# Patient Record
Sex: MALE | Race: WHITE
[De-identification: names, ages, dates, MRNs, and addresses within clinical notes are randomized per-mention and may not be internally consistent; named-entity substitution may affect disease eponyms.]

---

## 2018-04-18 ENCOUNTER — HOSPITAL ENCOUNTER (OUTPATIENT)
Dept: HOSPITAL 92 - SDC | Age: 58
Setting detail: OBSERVATION
LOS: 1 days | Discharge: HOME | End: 2018-04-19
Attending: NEUROLOGICAL SURGERY | Admitting: NEUROLOGICAL SURGERY
Payer: MEDICARE

## 2018-04-18 VITALS — BODY MASS INDEX: 21.9 KG/M2

## 2018-04-18 DIAGNOSIS — Z21: ICD-10-CM

## 2018-04-18 DIAGNOSIS — Z98.890: ICD-10-CM

## 2018-04-18 DIAGNOSIS — Z79.83: ICD-10-CM

## 2018-04-18 DIAGNOSIS — M47.12: Primary | ICD-10-CM

## 2018-04-18 DIAGNOSIS — Z79.82: ICD-10-CM

## 2018-04-18 DIAGNOSIS — Z79.52: ICD-10-CM

## 2018-04-18 DIAGNOSIS — M25.78: ICD-10-CM

## 2018-04-18 DIAGNOSIS — Z79.4: ICD-10-CM

## 2018-04-18 DIAGNOSIS — Z90.49: ICD-10-CM

## 2018-04-18 DIAGNOSIS — Z79.2: ICD-10-CM

## 2018-04-18 DIAGNOSIS — T86.12: ICD-10-CM

## 2018-04-18 DIAGNOSIS — Z79.899: ICD-10-CM

## 2018-04-18 LAB
ANION GAP SERPL CALC-SCNC: 11 MMOL/L (ref 10–20)
BASOPHILS # BLD AUTO: 0 THOU/UL (ref 0–0.2)
BASOPHILS NFR BLD AUTO: 0.2 % (ref 0–1)
BUN SERPL-MCNC: 37 MG/DL (ref 8.4–25.7)
CALCIUM SERPL-MCNC: 10.6 MG/DL (ref 7.8–10.44)
CHLORIDE SERPL-SCNC: 111 MMOL/L (ref 98–107)
CO2 SERPL-SCNC: 19 MMOL/L (ref 22–29)
CREAT CL PREDICTED SERPL C-G-VRATE: 86 ML/MIN (ref 70–130)
EOSINOPHIL # BLD AUTO: 0.1 THOU/UL (ref 0–0.7)
EOSINOPHIL NFR BLD AUTO: 1.9 % (ref 0–10)
GLUCOSE SERPL-MCNC: 171 MG/DL (ref 70–105)
HGB BLD-MCNC: 12.6 G/DL (ref 14–18)
LYMPHOCYTES # BLD: 1.6 THOU/UL (ref 1.2–3.4)
LYMPHOCYTES NFR BLD AUTO: 37.1 % (ref 21–51)
MCH RBC QN AUTO: 36.2 PG (ref 27–31)
MCV RBC AUTO: 102 FL (ref 80–94)
MONOCYTES # BLD AUTO: 0.6 THOU/UL (ref 0.11–0.59)
MONOCYTES NFR BLD AUTO: 14.4 % (ref 0–10)
NEUTROPHILS # BLD AUTO: 2 THOU/UL (ref 1.4–6.5)
NEUTROPHILS NFR BLD AUTO: 46.4 % (ref 42–75)
PLATELET # BLD AUTO: 110 THOU/UL (ref 130–400)
POTASSIUM SERPL-SCNC: 4.8 MMOL/L (ref 3.5–5.1)
RBC # BLD AUTO: 3.48 MILL/UL (ref 4.7–6.1)
SODIUM SERPL-SCNC: 136 MMOL/L (ref 136–145)
WBC # BLD AUTO: 4.3 THOU/UL (ref 4.8–10.8)

## 2018-04-18 PROCEDURE — 20930 SP BONE ALGRFT MORSEL ADD-ON: CPT

## 2018-04-18 PROCEDURE — G0378 HOSPITAL OBSERVATION PER HR: HCPCS

## 2018-04-18 PROCEDURE — 82962 GLUCOSE BLOOD TEST: CPT

## 2018-04-18 PROCEDURE — 36416 COLLJ CAPILLARY BLOOD SPEC: CPT

## 2018-04-18 PROCEDURE — A4216 STERILE WATER/SALINE, 10 ML: HCPCS

## 2018-04-18 PROCEDURE — 85025 COMPLETE CBC W/AUTO DIFF WBC: CPT

## 2018-04-18 PROCEDURE — 0RT30ZZ RESECTION OF CERVICAL VERTEBRAL DISC, OPEN APPROACH: ICD-10-PCS | Performed by: NEUROLOGICAL SURGERY

## 2018-04-18 PROCEDURE — 97139 UNLISTED THERAPEUTIC PX: CPT

## 2018-04-18 PROCEDURE — 22845 INSERT SPINE FIXATION DEVICE: CPT

## 2018-04-18 PROCEDURE — C1713 ANCHOR/SCREW BN/BN,TIS/BN: HCPCS

## 2018-04-18 PROCEDURE — 22551 ARTHRD ANT NTRBDY CERVICAL: CPT

## 2018-04-18 PROCEDURE — 97116 GAIT TRAINING THERAPY: CPT

## 2018-04-18 PROCEDURE — 93010 ELECTROCARDIOGRAM REPORT: CPT

## 2018-04-18 PROCEDURE — 20936 SP BONE AGRFT LOCAL ADD-ON: CPT

## 2018-04-18 PROCEDURE — 93005 ELECTROCARDIOGRAM TRACING: CPT

## 2018-04-18 PROCEDURE — 80048 BASIC METABOLIC PNL TOTAL CA: CPT

## 2018-04-18 PROCEDURE — 76001: CPT

## 2018-04-18 PROCEDURE — 97535 SELF CARE MNGMENT TRAINING: CPT

## 2018-04-18 PROCEDURE — C1776 JOINT DEVICE (IMPLANTABLE): HCPCS

## 2018-04-18 PROCEDURE — 22853 INSJ BIOMECHANICAL DEVICE: CPT

## 2018-04-18 PROCEDURE — 22552 ARTHRD ANT NTRBD CERVICAL EA: CPT

## 2018-04-18 PROCEDURE — 0RG20A0 FUSION OF 2 OR MORE CERVICAL VERTEBRAL JOINTS WITH INTERBODY FUSION DEVICE, ANTERIOR APPROACH, ANTERIOR COLUMN, OPEN APPROACH: ICD-10-PCS | Performed by: NEUROLOGICAL SURGERY

## 2018-04-18 RX ADMIN — Medication SCH GM: at 14:21

## 2018-04-18 RX ADMIN — INSULIN HUMAN SCH: 100 INJECTION, SUSPENSION SUBCUTANEOUS at 19:46

## 2018-04-18 RX ADMIN — SODIUM BICARBONATE SCH: 325 TABLET ORAL at 20:26

## 2018-04-18 RX ADMIN — Medication SCH GM: at 22:15

## 2018-04-18 RX ADMIN — ACETAMINOPHEN AND CODEINE PHOSPHATE PRN TAB: 300; 30 TABLET ORAL at 16:23

## 2018-04-18 RX ADMIN — INSULIN HUMAN SCH: 100 INJECTION, SUSPENSION SUBCUTANEOUS at 14:28

## 2018-04-18 RX ADMIN — Medication SCH: at 20:27

## 2018-04-18 NOTE — HP
HISTORY OF PRESENT ILLNESS:  Mr. Viveros is a 57-year-old man is known to us for inpatient evaluation.
  No significant myelopathic syndrome with upper extremity and lower extremity weakness after sufferi
ng a fall at home.  This is all of the setting of an MRI.  It got wide showing critical high grade st
enosis at C5-C6 and C6-C7 with signal change in the cord ____ C6-C7 and will not be needing to discus
s surgical decompression at these 2 levels.

 

PAST MEDICAL HISTORY:  Significant for kidney transplant kidney failure, HIV/AIDS.

 

PAST SURGICAL HISTORY:  Kidney transplant, appendectomy, unspecified arm surgery, carotid artery surg
fredy.

 

CURRENT MEDICATIONS:  None specified.

 

ALLERGIES:  No known drug allergies.

 

PHYSICAL EXAMINATION:  The patient is alert and oriented x3.  He is wheelchair bound secondary to sig
nificant ataxic gait and leg weakness.  He has a 4-/5 strength in bilateral upper extremities in all 
movements and 3+/5 strength in bilateral lower extremities movements, particularly with hip flexion a
nd knee extension.  He has a positive Troy's bilaterally.

 

____ C4 through C7 ACDF.  He explained to the patient the risks, benefits, and alternatives to the pr
ocedure.  The patient expressed understanding and would like to move forward with surgery as elliott meeks.  I do believe the patient is mentally competent and capable of making medical decisions for himsel
f and we will move forward with surgery as planned.

 

Duane See PA-C, dictating for Dr. Cortes.

## 2018-04-18 NOTE — OP
DATE OF PROCEDURE:  04/18/2018  

 

SURGEON:  Jet Cortes M.D.

 

FIRST ASSISTANT:  Stoney See PA-C.

 

INDICATION:  Prevent neurologic decline.

 

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

 

ANESTHESIA:  General.

 

PROCEDURE IN DETAIL:  The patient was brought into the operating room and placed on the table in a bryant
pine position with attention towards keeping his neck neutral.  A transverse incision was planned ove
r the lateral aspect of the neck on the right.  After prepping and draping and after an appropriate o
perative pause, the incision was created.  The underlying platysma muscles identified and incised.  A
 blunt tissue plane anterior to the sternocleidomastoid muscle was used to gain access to the prevert
ebral space.  Self-retaining retractors were placed in the wound for optimal exposure.  Annulotomies 
were performed at C5-6 and C6-7, disk material and anterior and posterior osteophytes were removed at
 both levels under distraction.  After completely decompressing the underlying spinal canal 6 mm lord
otic PEEK cages packed with allograft and autograft material were placed within the interbody space. 
 An anterior cervical plate was then fashioned in front of the spine and secured with a total of 6 sc
rews.  Midline and lateral structures were then inspected and found to be free from significant traum
a.  The wound was irrigated.  Hemostasis was maintained throughout.  The wound was then closed in lynda
tomic layers and a pressure dressing was applied.  There were no known procedural complications.

## 2018-04-19 VITALS — SYSTOLIC BLOOD PRESSURE: 143 MMHG | TEMPERATURE: 98.3 F | DIASTOLIC BLOOD PRESSURE: 77 MMHG

## 2018-04-19 RX ADMIN — INSULIN HUMAN SCH: 100 INJECTION, SUSPENSION SUBCUTANEOUS at 09:10

## 2018-04-19 RX ADMIN — Medication SCH ML: at 09:13

## 2018-04-19 RX ADMIN — INSULIN HUMAN SCH: 100 INJECTION, SUSPENSION SUBCUTANEOUS at 13:32

## 2018-04-19 RX ADMIN — INSULIN HUMAN SCH: 100 INJECTION, SUSPENSION SUBCUTANEOUS at 10:34

## 2018-04-19 RX ADMIN — ACETAMINOPHEN AND CODEINE PHOSPHATE PRN TAB: 300; 30 TABLET ORAL at 12:27

## 2018-04-19 RX ADMIN — SODIUM BICARBONATE SCH: 325 TABLET ORAL at 09:13

## 2018-04-20 NOTE — DIS
ENCOUNTER:  07:45 this morning.

 

Mr. Viveros is a 57-year-old man who was admitted to [facility] in the postoperative period b
y Dr. Jet Cortes on 04/18/2018 and subsequently discharged the following day 04/19/2018.

 

ADMISSION DIAGNOSIS:  Status post anterior cervical discectomy and fusion.

 

DISCHARGE DIAGNOSES:  Status post anterior cervical discectomy and fusion and cervical myelopathy.

 

HOSPITAL COURSE:  Mr. Viveros stay was not complicated by pain or any other reason, we did have consul
t ordered to case management and physical therapy to help facilitate him back to his nursing facility
 _____.  He was ultimately transferred there in good condition with outpatient followup planned at 2 
weeks.

## 2018-04-22 NOTE — EKG
Test Reason : PREOP

Blood Pressure : ***/*** mmHG

Vent. Rate : 089 BPM     Atrial Rate : 089 BPM

   P-R Int : 146 ms          QRS Dur : 096 ms

    QT Int : 354 ms       P-R-T Axes : 059 012 143 degrees

   QTc Int : 430 ms

 

Normal sinus rhythm

Left ventricular hypertrophy with repolarization abnormality

Abnormal ECG

When compared with ECG of 30-MAR-2013 18:08,

No significant change was found

Confirmed by VINAY MARTINEZ MD ([id number]) on 4/22/2018 8:45:41 AM

 

Referred By:  IMMANUEL           Confirmed By:VINAY MARTINEZ MD

## 2018-05-29 ENCOUNTER — HOSPITAL ENCOUNTER (OUTPATIENT)
Dept: HOSPITAL 92 - TBSIIMAG | Age: 58
Discharge: HOME | End: 2018-05-29
Attending: NEUROLOGICAL SURGERY
Payer: COMMERCIAL

## 2018-05-29 DIAGNOSIS — Z98.890: ICD-10-CM

## 2018-05-29 DIAGNOSIS — M47.22: Primary | ICD-10-CM

## 2018-05-29 DIAGNOSIS — M48.02: ICD-10-CM

## 2018-05-29 PROCEDURE — 72040 X-RAY EXAM NECK SPINE 2-3 VW: CPT

## 2018-05-29 NOTE — RAD
CERVICAL SPINE THREE VIEWS:

 

Date: 5-29-18

 

Comparison: None. 

 

History: Fall, history of surgery, cervical radiculopathy. 

 

FINDINGS: 

Extensive post-operative clips overlie the neck bilaterally. There is anterior discectomy and fusion 
hardware present at C5-6 and C6-7. There is disc space narrowing with degenerative endplate change an
d anterior osteophyte formation at C3-4 and C4-5. There is no significant anterolisthesis or retrolis
thesis. No prevertebral soft tissue swelling is seen. Open mouth odontoid view demonstrates a normal 
appearing dens and C1-2 articulation. At C3-4 there is bilateral facet and uncal vertebral osteophyte
 formation, left greater than right. 

 

IMPRESSION: 

Multilevel post-operative and degenerative change present within the cervical spine as detailed above
. 

 

POS: BYRON